# Patient Record
Sex: FEMALE | Race: WHITE | URBAN - METROPOLITAN AREA
[De-identification: names, ages, dates, MRNs, and addresses within clinical notes are randomized per-mention and may not be internally consistent; named-entity substitution may affect disease eponyms.]

---

## 2017-10-03 ENCOUNTER — HOSPITAL ENCOUNTER (OUTPATIENT)
Dept: ENDOSCOPY | Age: 82
Discharge: HOME | End: 2017-10-03
Attending: INTERNAL MEDICINE | Admitting: INTERNAL MEDICINE

## 2017-10-03 VITALS
HEART RATE: 74 BPM | BODY MASS INDEX: 32.1 KG/M2 | TEMPERATURE: 97.3 F | RESPIRATION RATE: 20 BRPM | WEIGHT: 188 LBS | DIASTOLIC BLOOD PRESSURE: 61 MMHG | SYSTOLIC BLOOD PRESSURE: 164 MMHG | HEIGHT: 64 IN | OXYGEN SATURATION: 98 %

## 2017-10-03 RX ORDER — FLUTICASONE PROPIONATE 50 MCG
1 SPRAY, SUSPENSION (ML) NASAL DAILY
COMMUNITY

## 2017-10-03 RX ORDER — ECHINACEA PURPUREA EXTRACT 125 MG
1 TABLET ORAL 3 TIMES DAILY
COMMUNITY

## 2017-10-03 RX ORDER — DEXTRIN 3 G/3.5 G
POWDER (GRAM) ORAL DAILY
COMMUNITY

## 2017-10-03 RX ORDER — VALSARTAN 160 MG/1
TABLET ORAL DAILY
Refills: 11 | COMMUNITY
Start: 2017-09-04

## 2017-10-03 RX ORDER — LORATADINE 10 MG/1
CAPSULE, LIQUID FILLED ORAL DAILY
COMMUNITY

## 2017-10-03 RX ORDER — LEVOTHYROXINE SODIUM 0.1 MG/1
100 TABLET ORAL DAILY
COMMUNITY

## 2017-10-03 RX ORDER — FOLIC ACID 1 MG/1
TABLET ORAL DAILY
Refills: 3 | COMMUNITY
Start: 2017-09-21

## 2017-10-03 RX ORDER — ACETAMINOPHEN 500 MG
500 TABLET ORAL DAILY PRN
COMMUNITY

## 2017-10-03 RX ORDER — SIMVASTATIN 20 MG
TABLET ORAL DAILY
Refills: 2 | COMMUNITY
Start: 2017-08-31

## 2017-10-03 RX ORDER — VALSARTAN 80 MG/1
TABLET ORAL DAILY
Refills: 5 | COMMUNITY
Start: 2017-09-04

## 2017-10-03 RX ORDER — SENNA PLUS 8.6 MG/1
1 TABLET ORAL 2 TIMES DAILY
COMMUNITY

## 2017-10-03 NOTE — H&P
History and Physical / Pre-Sedation Assessment    Patient:  Francoise Glass   :   1924     Intended Procedure:  colonoscopy    HPI: recurrent diarrhea. H/o colon cancer, s/p left resection  Hypertension  Hyperlipidemia  Hypothyroidism  Osteoarthritis  Spinal atenosis    Nurses notes reviewed and agreed. Medications reviewed  Allergies: Allergies   Allergen Reactions    Methocarbamol Other (See Comments)     ROBAXIN  Doesn't remember long time ago    Adhesive Tape Rash       Physical Exam:  Vital Signs: BP (!) 164/61  Pulse 74  Temp 97.3 °F (36.3 °C) (Oral)   Resp 20  Ht 5' 4\" (1.626 m)  Wt 188 lb (85.3 kg)  LMP 10/03/1970  SpO2 98%  BMI 32.27 kg/m2   Pulmonary:Normal  Cardiac:Normal  Abdomen:Normal    Pre-Procedure Assessment / Plan:  ASA 2 - Patient with mild systemic disease with no functional limitations  Level of Sedation Plan: Moderate sedation  Post Procedure plan: Return to same level of care    I assessed the patient and find that the patient is in satisfactory condition to proceed with the planned procedure and sedation plan. I have explained the risk, benefits, and alternatives to the procedure. The patient understands and agrees to proceed.   Missy Valerio  11:33 AM 10/3/2017
Doc#: [id number]

## 2017-10-03 NOTE — PLAN OF CARE
Verify NPO status [x] IV fluids as support [x] Clear liquids and/or ice chips as ordered  [x] Tolerating clear liquids  [x] Special diet as ordered  [x] D/C IV fluids   ACTIVITY  [x] Assess level of function  [x]  Specified by physician  [x]  Activity as tolerated [x] Position on left side [x] Position on left side and reposition patient as physician ordered [x] Gradually elevate HOB to baltazars position  [x] Position changes as patient tolerated  [x] Ambulate as pre-procedure   PATIENT / S.O. EDUCATION [x] Pre, Intra Post-procedure  teaching appropriate to procedure  [x] Conscious Sedation Teaching  [x] Pain Management - instructed [x] Encourage questions  [x] Clarify any concerns [x] Safety devices maintain to  prevent patient injury  [x] Assist and support patient  [x] Observe standard precautions [x] Physician confers with the family/S.O. [x] Short visit from family in [place] area  [x] Physician specific post-procedure orders  [x] S/S complications with proper [x]F/U; office visits F/U  [x] Review discharge instructions, medicine to family /S. O. [x] Medication/ special diet information given to family/ S.O. [x]  Copy of discharge instructions givn to family/S.O.   OUTCOME PLANNING  DISCHARGE PLAN [x] Patient/S. O. will verbalize understanding of admission procedure & expectations of outcome in realistic terms   [x] Patient verbalize the role of family/S. O. in plan of care  [x] Patient will have designated responsible person available for discharge.  [x] Patient will demonstrate an  understanding of the planned  procedure and its related procedures and conscious sedation [x] Patient will:  - receive services according to the           standards of care  - receive standards for conscious       sedation  - remain free of injury [x] Patient will:  - have stable vital signs based on Fatemeh Score   - be pain free or tolerable, have no bleeding  - have minimal abdominal distention   -  return to pre-procedure level of orientation   -  tolerate fluid with no nausea and vomiting  -  ambulate as pre-procedure ADL   - verbalize understanding of the discharge instructions     Meenakshi Mendes  9:15 AM     Jenny Green  11:23 AM

## 2017-10-03 NOTE — IP AVS SNAPSHOT
1 spray by Nasal route 3 times daily                                         TEARS NATURALE OP   Apply to eye as needed BOTH EYES                                         valsartan 160 MG tablet   Commonly known as:  DIOVAN   daily                                         valsartan 80 MG tablet   Commonly known as:  DIOVAN   daily                                         vitamin D 1000 UNIT Tabs tablet   Commonly known as:  CHOLECALCIFEROL   Take 1,000 Units by mouth daily                                                 Allergies as of 10/3/2017        Reactions    Methocarbamol Other (See Comments)    ROBAXIN  Doesn't remember long time ago    Adhesive Tape Rash      Immunizations as of 10/3/2017     No immunizations on file. Last Vitals          Most Recent Value    Temp  97.3 °F (36.3 °C)    Pulse  74    Resp  20    BP  (!)  164/61         After Visit Summary    This summary was created for you. Thank you for entrusting your care to us. The following information includes details about your hospital/visit stay along with steps you should take to help with your recovery once you leave the hospital.  In this packet, you will find information about the topics listed below:    · Instructions about your medications including a list of your home medications  · A summary of your hospital visit  · Follow-up appointments once you have left the hospital  · Your care plan at home      You may receive a survey regarding the care you received during your stay. Your input is valuable to us. We encourage you to complete and return your survey in the envelope provided. We hope you will choose us in the future for your healthcare needs.           Patient Information     Patient Name EDGAR De Santiago 1924      Care Provided at:     Name Address Phone       1 [facility] [address] [place] [id number] [phone]            Your Visit Here you will find information about your visit, including the reason for your visit. Please take this sheet with you when you visit your doctor or other health care provider in the future. It will help determine the best possible medical care for you at that time. If you have any questions once you leave the hospital, please call the department phone number listed below. Why you were here     Your primary diagnosis was:  Not on File      Visit Information     Date & Time Provider Department Dept. Phone    10/3/2017 Samir Kelly MD [facility] [phone]       Follow-up Appointments    Below is a list of your follow-up and future appointments. This may not be a complete list as you may have made appointments directly with providers that we are not aware of or your providers may have made some for you. Please call your providers to confirm appointments. It is important to keep your appointments. Please bring your current insurance card, photo ID, co-pay, and all medication bottles to your appointment. If self-pay, payment is expected at the time of service. Preventive Care        Date Due    Tetanus Combination Vaccine (1 - Tdap) 6/24/1943    Zoster Vaccine 6/24/1984    Pneumococcal Vaccines (two) for all adults aged 72 and over (1 of 2 - PCV13) 6/24/1989    Yearly Flu Vaccine (1) 9/1/2017                 Care Plan Once You Return Home    This section includes instructions you will need to follow once you leave the hospital.  Your care team will discuss these with you, so you and those caring for you know how to best care for your health needs at home. This section may also include educational information about certain health topics that may be of help to you. Discharge Instructions                   Endoscopy Discharge Instructions    Call the physician that did your procedure for any questions or concerns.     Dr Doni Robb          [phone] You may be drowsy or lightheaded after receiving sedation. DO NOT operate  a vehicle (automobile, bicycle, motorcycle, machinery, or power tools), no  alcoholic beverages, and do not make any important decisions today. Plan on bed rest or quiet relaxation today. Resume normal activities in the morning. Resume normal activity tomorrow unless otherwise advised by your physician. Eat a light first meal, avoiding spicy and fatty foods, then resume normal diet unless  you are told otherwise by your physician. If the intravenous medication site is painful, apply warm compresses on the site until the soreness is relieved and elevate the arm above the heart. Call your physician if no improvement  in 2-3 days. POSSIBLE SYMPTOMS TO WATCH:     1. fever (greater than 100) 5. increased abdominal bloating   2. severe pain   6. excessive bleeding   3. nausea and vomiting  7. chest pain   4. chills    8. shortness of breath       Notify your physician if these problems occur     Expected as normal and remedies:  1. Sore throat: use over the counter throat lozenges or gargle with warm salt water. 2. Redness or soreness at the IV site: apply warm compress  3. Gaseous discomfort: belching or passing flatus (gas). Call for follow-up appointment in:    2 weeks                Educational information given on:_______________________                       We want to thank you for choosing the [facility]. as your health care provider. We hope that you received excellent care while you were here. You may receive a survey in the mail regarding your care. We would appreciate you taking a  few minutes of your time to complete this survey. Again, thank you for choosing the [facility]                 Please review these discharge instructions this evening or tomorrow for               information you may have forgotten.                  Important information for a smoker new information is available in [employer]. 9. Click Sign Up. You can now view your medical record. Additional Information  If you have questions, please contact the physician practice where you receive care. Remember, [employer] is NOT to be used for urgent needs. For medical emergencies, dial 911. For questions regarding your [employer] account call [phone]. If you have a clinical question, please call your doctor's office. View your information online  ? Review your current list of  medications, immunization, and allergies. ? Review your future test results online . ? Review your discharge instructions provided by your caregivers at discharge    Certain functionality such as prescription refills, scheduling appointments or sending messages to your provider are not activated if your provider does not use [employer] in his/her office    For questions regarding your [employer] account call [phone]. If you have a clinical question, please call your doctor's office. The information on all pages of the After Visit Summary has been reviewed with me, the patient and/or responsible adult, by my health care provider(s). I had the opportunity to ask questions regarding this information. I understand I should dispose of my armband safely at home to protect my health information. A complete copy of the After Visit Summary has been given to me, the patient and/or responsible adult.            Patient Signature/Responsible Adult:____________________    Clinician Signature:_____________________    Date:_____________________    Time:_____________________

## 2020-02-27 NOTE — OP NOTE
[address] [address]                               OPERATIVE REPORT    PATIENT NAME: Rah Carpenter                      :             1924  MED REC NO:   [id number]                           ROOM:  ACCOUNT NO:   [de-identified]                           ADMISSION DATE:  03/10/2017  PROVIDER:     Katelyn Oswald MD    DATE OF PROCEDURE:  03/10/2017    INDICATIONS FOR PROCEDURE:  Persistent recurrent diarrhea in a lady  with history of colon cancer for which she had resection in the left  side of the colon and has not had colonoscopy since . Stool  studies are negative. COLONOSCOPY:  With the patient in the left lateral position and after  sedation with 37.5 mg of Demerol and 2.5 mg of Versed IV, the Olympus  video colonoscope was inserted into the rectum and carefully advanced  to the cecum. A 1 cm polyp noted in the ascending colon, which was  entirely removed using the biopsy forceps technique. Single polyp was  noted in the rectum, which was removed using the same technique. Rare  diverticuli is noted. Random biopsies were obtained to rule out  collagenous colitis. No other abnormality was seen. The anastomosis  was patent. No recurrence of cancer. Scope was then removed without  complication. IMPRESSION:  1. Ascending colon polyp - removed. 2.  Rectal polyp - removed. 3.  Minor diverticulosis. 4.  Status post left colon resection for carcinoma of the colon. No  recurrence. Thank you Dr. Mary Mccabe for your kind referral of the patient.         Sarath Burrell MD    D: 03/10/2017 11:41:35       T: 03/10/2017 20:51:37     [email or web address]  Job#: [id number]     Doc#: [id number]    CC:  Noah Goddard MD Rec i-LASIK OU Goal: loretta OU.